# Patient Record
Sex: MALE | HISPANIC OR LATINO | Employment: FULL TIME | URBAN - METROPOLITAN AREA
[De-identification: names, ages, dates, MRNs, and addresses within clinical notes are randomized per-mention and may not be internally consistent; named-entity substitution may affect disease eponyms.]

---

## 2023-06-26 ENCOUNTER — OFFICE VISIT (OUTPATIENT)
Dept: FAMILY MEDICINE | Facility: CLINIC | Age: 33
End: 2023-06-26
Payer: COMMERCIAL

## 2023-06-26 VITALS
SYSTOLIC BLOOD PRESSURE: 133 MMHG | RESPIRATION RATE: 10 BRPM | OXYGEN SATURATION: 99 % | WEIGHT: 200.2 LBS | DIASTOLIC BLOOD PRESSURE: 81 MMHG | TEMPERATURE: 98.7 F | HEART RATE: 61 BPM

## 2023-06-26 DIAGNOSIS — R10.11 RUQ ABDOMINAL PAIN: ICD-10-CM

## 2023-06-26 DIAGNOSIS — K29.00 ACUTE GASTRITIS WITHOUT HEMORRHAGE, UNSPECIFIED GASTRITIS TYPE: Primary | ICD-10-CM

## 2023-06-26 DIAGNOSIS — Z87.19 HX OF GASTROESOPHAGEAL REFLUX (GERD): ICD-10-CM

## 2023-06-26 DIAGNOSIS — R10.9 FLANK PAIN: ICD-10-CM

## 2023-06-26 LAB
ALBUMIN UR-MCNC: NEGATIVE MG/DL
APPEARANCE UR: CLEAR
BILIRUB UR QL STRIP: NEGATIVE
COLOR UR AUTO: YELLOW
GLUCOSE UR STRIP-MCNC: NEGATIVE MG/DL
HGB UR QL STRIP: NEGATIVE
KETONES UR STRIP-MCNC: NEGATIVE MG/DL
LEUKOCYTE ESTERASE UR QL STRIP: NEGATIVE
NITRATE UR QL: NEGATIVE
PH UR STRIP: 5.5 [PH] (ref 5–8)
SP GR UR STRIP: >=1.03 (ref 1–1.03)
UROBILINOGEN UR STRIP-ACNC: 0.2 E.U./DL

## 2023-06-26 PROCEDURE — 99204 OFFICE O/P NEW MOD 45 MIN: CPT | Performed by: NURSE PRACTITIONER

## 2023-06-26 PROCEDURE — 81003 URINALYSIS AUTO W/O SCOPE: CPT | Performed by: NURSE PRACTITIONER

## 2023-06-26 RX ORDER — IBUPROFEN 200 MG
600 TABLET ORAL ONCE
Status: COMPLETED | OUTPATIENT
Start: 2023-06-26 | End: 2023-06-26

## 2023-06-26 RX ADMIN — Medication 600 MG: at 17:13

## 2023-06-26 ASSESSMENT — ENCOUNTER SYMPTOMS
CONSTIPATION: 0
CHILLS: 0
VOMITING: 0
DYSURIA: 0
FEVER: 0
NAUSEA: 0
HEMATURIA: 0
DIARRHEA: 0

## 2023-06-26 NOTE — PATIENT INSTRUCTIONS
Call [facility] to see if they can recheck you in 3-4 days.      Come back or to emergency room if worse     Can buy and use Mylanta for heartburn ded per package instructions.      [place] diet -- see handout.

## 2023-06-26 NOTE — PROGRESS NOTES
Assessment & Plan     Flank pain    - UA Macroscopic with reflex to Microscopic and Culture  - ibuprofen (ADVIL/MOTRIN) tablet 600 mg    RUQ abdominal pain    - lidocaine (viscous) (XYLOCAINE) 2 % 15 mL, alum & mag hydroxide-simethicone (MAALOX) 15 mL GI Cocktail  - omeprazole (PRILOSEC) 20 MG DR capsule  Dispense: 28 capsule; Refill: 0    Hx of gastroesophageal reflux (GERD)    - lidocaine (viscous) (XYLOCAINE) 2 % 15 mL, alum & mag hydroxide-simethicone (MAALOX) 15 mL GI Cocktail  - omeprazole (PRILOSEC) 20 MG DR capsule  Dispense: 28 capsule; Refill: 0    Acute gastritis without hemorrhage, unspecified gastritis type       Patient with persistent abdominal pain for about a week, worse over the last 3 days.  Points more so to the right upper quadrant area, but is not particularly tender on exam with a negative Ross sign.     Does rate the pain as severe.  Did consider kidney stone first with negative urine.      Does have a history of frequent episodes of heartburn despite use of daily omeprazole.  Is bloated with eating.    After negative urine, did try a GI cocktail.  Patient felt better immediately following this. Pain 4/10 after GI cocktail.      Recommend eliminating spicy foods, tomatoes, greasy food.  Increasing omeprazole from once a day to twice a day for 2 weeks.  If pain is worsening or he develops a fever, he needs to come back or go to emergency room for further evaluation rather than waiting for improvement.  Explained there are numerous other things that could be wrong, but it is a good sign that he has had frequent heartburn recently and the pain got rapidly better a few minutes after GI cocktail.      Recommend close follow-up with PCP, ideally recheck later this week even if doing better.    Also try Maalox or Mylanta as needed.      28 minutes spent by me on the date of the encounter doing chart review, patient visit, documentation and Rechecks         Return in about 3 days (around 6/29/2023)  "for If no better.    Paula Chou, CNP  [facility] JENNIFER Root is a 33 year old male who presents to clinic today for the following health issues:  Chief Complaint   Patient presents with     Abdominal Pain     X 1 week but flare up in pain was yesterday. RT side, feels bloated, stabbing pain.      HPI    Rt upper abd pain starting last week, but worse x 1 day.  Pain is 8/10.  Is not taken anything for pain today.    Feels like \"colic\" or shooting.      No h/o kidney stone.     Feels more bloated with eating.  Eats a lot of spicy food.    No fevers.      Hx GERD on omeprazole. Gets frequent heartburn.  Gets it numerous times per week despite taking omeprazole regularly.  However, he does not have any heartburn currently.  This was prescribed by primary care clinic.  Denies other hx surgeries or medical issues.            Review of Systems   Constitutional: Negative for chills and fever.   Gastrointestinal: Negative for constipation, diarrhea, nausea and vomiting.   Genitourinary: Negative for dysuria, hematuria and urgency.           Objective    /81 (BP Location: Right arm, Patient Position: Sitting, Cuff Size: Adult Regular)   Pulse 61   Temp 98.7  F (37.1  C) (Oral)   Resp 10   Wt 90.8 kg (200 lb 3.2 oz)   SpO2 99%   Physical Exam  Constitutional:       Appearance: He is well-developed.   HENT:      Right Ear: External ear normal.      Left Ear: External ear normal.   Eyes:      General:         Right eye: No discharge.         Left eye: No discharge.      Conjunctiva/sclera: Conjunctivae normal.   Pulmonary:      Effort: Pulmonary effort is normal.   Abdominal:      General: Bowel sounds are normal. There is no distension.      Palpations: Abdomen is soft.      Tenderness: There is abdominal tenderness (Mild tenderness right middle to upper abdomen.). There is no right CVA tenderness or guarding.   Musculoskeletal:         General: Normal range of motion. "   Skin:     General: Skin is warm.   Neurological:      Mental Status: He is alert and oriented to person, place, and time.   Psychiatric:         Mood and Affect: Mood normal.         Behavior: Behavior normal.         Thought Content: Thought content normal.         Judgment: Judgment normal.        Results for orders placed or performed in visit on 06/26/23   UA Macroscopic with reflex to Microscopic and Culture     Status: Normal    Specimen: Urine, Clean Catch   Result Value Ref Range    Color Urine Yellow Colorless, Straw, Light Yellow, Yellow    Appearance Urine Clear Clear    Glucose Urine Negative Negative mg/dL    Bilirubin Urine Negative Negative    Ketones Urine Negative Negative mg/dL    Specific Gravity Urine >=1.030 1.005 - 1.030    Blood Urine Negative Negative    pH Urine 5.5 5.0 - 8.0    Protein Albumin Urine Negative Negative mg/dL    Urobilinogen Urine 0.2 0.2, 1.0 E.U./dL    Nitrite Urine Negative Negative    Leukocyte Esterase Urine Negative Negative    Narrative    Microscopic not indicated

## 2023-09-11 ENCOUNTER — HOSPITAL ENCOUNTER (EMERGENCY)
Facility: CLINIC | Age: 33
Discharge: HOME | End: 2023-09-11
Payer: OTHER MISCELLANEOUS

## 2023-09-11 ENCOUNTER — APPOINTMENT (OUTPATIENT)
Dept: RADIOLOGY | Facility: CLINIC | Age: 33
End: 2023-09-11
Payer: OTHER MISCELLANEOUS

## 2023-09-11 VITALS
OXYGEN SATURATION: 99 % | HEART RATE: 74 BPM | WEIGHT: 195 LBS | HEIGHT: 68 IN | DIASTOLIC BLOOD PRESSURE: 80 MMHG | BODY MASS INDEX: 29.55 KG/M2 | TEMPERATURE: 98.4 F | RESPIRATION RATE: 20 BRPM | SYSTOLIC BLOOD PRESSURE: 140 MMHG

## 2023-09-11 DIAGNOSIS — S62.656B OPEN NONDISPLACED FRACTURE OF MIDDLE PHALANX OF RIGHT LITTLE FINGER, INITIAL ENCOUNTER: ICD-10-CM

## 2023-09-11 DIAGNOSIS — S61.216A LACERATION OF RIGHT LITTLE FINGER WITHOUT FOREIGN BODY WITHOUT DAMAGE TO NAIL, INITIAL ENCOUNTER: ICD-10-CM

## 2023-09-11 PROCEDURE — 29130 APPL FINGER SPLINT STATIC: CPT | Mod: F9

## 2023-09-11 PROCEDURE — 12041 INTMD RPR N-HF/GENIT 2.5CM/<: CPT

## 2023-09-11 PROCEDURE — 250N000009 HC RX 250

## 2023-09-11 PROCEDURE — 73140 X-RAY EXAM OF FINGER(S): CPT | Mod: RT

## 2023-09-11 PROCEDURE — 99284 EMERGENCY DEPT VISIT MOD MDM: CPT | Mod: 25

## 2023-09-11 RX ORDER — GINSENG 100 MG
CAPSULE ORAL ONCE
Status: COMPLETED | OUTPATIENT
Start: 2023-09-11 | End: 2023-09-11

## 2023-09-11 RX ORDER — CIPROFLOXACIN 500 MG/1
500 TABLET, FILM COATED ORAL 2 TIMES DAILY
Qty: 10 TABLET | Refills: 0 | Status: SHIPPED | OUTPATIENT
Start: 2023-09-11 | End: 2023-09-16

## 2023-09-11 RX ORDER — CEPHALEXIN 500 MG/1
500 CAPSULE ORAL 4 TIMES DAILY
Qty: 20 CAPSULE | Refills: 0 | Status: SHIPPED | OUTPATIENT
Start: 2023-09-11 | End: 2023-09-11

## 2023-09-11 RX ADMIN — BACITRACIN: 500 OINTMENT TOPICAL at 22:28

## 2023-09-11 ASSESSMENT — ACTIVITIES OF DAILY LIVING (ADL): ADLS_ACUITY_SCORE: 33

## 2023-09-11 ASSESSMENT — ENCOUNTER SYMPTOMS
NUMBNESS: 1
WOUND: 1

## 2023-09-12 NOTE — ED TRIAGE NOTES
Patient arrives for right pinky finger injury. Patient was at work and finger got crushed in a machine. Bleeding controlled in triage.      Triage Assessment       Row Name 09/11/23 1913       Triage Assessment (Adult)    Airway WDL WDL       Respiratory WDL    Respiratory WDL WDL       Skin Circulation/Temperature WDL    Skin Circulation/Temperature WDL WDL       Cardiac WDL    Cardiac WDL WDL       Peripheral/Neurovascular WDL    Peripheral Neurovascular WDL WDL       Cognitive/Neuro/Behavioral WDL    Cognitive/Neuro/Behavioral WDL WDL

## 2023-09-12 NOTE — ED PROVIDER NOTES
EMERGENCY DEPARTMENT ENCOUNTER      NAME: Dk Metz  AGE: 33 year old male  YOB: 1990  MRN: [id number]  EVALUATION DATE & TIME: No admission date for patient encounter.    PCP: No Ref-Primary, Physician    ED PROVIDER: Elaine Feliz PA-C      Chief Complaint   Patient presents with    Finger Injury         FINAL IMPRESSION:  1. Laceration of right little finger without foreign body without damage to nail, initial encounter    2. Open nondisplaced fracture of middle phalanx of right little finger, initial encounter          ED COURSE & MEDICAL DECISION MAKIN:15 PM Met with patient for initial interview. Plan for care discussed.  9:05 PM Reevaluated and updated patient on imaging results.   9:38 PM I performed a laceration procedure on the patient.  10:05 PM I discussed the plan for discharge with the patient, and patient is agreeable. We discussed supportive cares at home and reasons for return to the ER including new or worsening symptoms. All questions and concerns addressed. Patient to be discharged by RN.    Pertinent Labs & Imaging studies reviewed. (See chart for details)  33 year old male presents to the Emergency Department for evaluation of right pinky laceration crush injury while at work working on machine. Patient sent in from  for XR and closure. Upon exam, patient is afebrile, mildly hypertensive, but in no acute distress. Right pinky laceration as pictured below. Neurovascularly intact distally. ROM intact, but limited secondary to pain. No nail or obvious tendon involvement. 5/5 strength against resistance flexion and extension at PIP and DIP. Differential diagnosis includes but not limited to fracture, dislocation, contusion. Based on patient's presenting symptoms, imaging were ordered. XR revealed small opacity over base of middle phalanx, which appears consistent with age indeterminate fracture, but in setting of recent trauma will treat as acute  fracture.    Patient declined pain medication upon arrival. Tetanus up-to-date. Based on the presenting symptoms, the wound was irrigated, explored, and closed with 9 sutures as documented below. Finger was placed in splint for suspected fracture. Discussed in setting of acute fracture and laceration through glove, will start on Cipro and have patient follow up with orthopedics. Patient was educated on signs of infection including redness, drainage, warmth, fever/chills with instructions to return immediately if infection suspected or new weakness/numbness/tingling, decreased ROM, or cold extremity. Patient also educated to keep the wound clean and dry for the next 24 hours. Patient advised to set up an appointment with PCP in 10-12 days for suture removal and follow up with orthopedics later this week for reevaluation, referral placed today. The patient was advised to return to the ER if new or worsening symptoms develop. The patient was stable and well appearing throughout entire ER visit and upon discharge. The patient verbalizes understanding and agrees with the plan.    Medical Decision Making    History:  Supplemental history from: Documented in chart, if applicable and Family Member/Significant Other  External Record(s) reviewed: Documented in chart, if applicable. and Outpatient Record: Patient visited [facility] on 9/11, with a concern of a laceration on 5th finger extending to PIP joint. Sent to ER for x-ray.    Work Up:  Chart documentation includes differential considered and any EKGs or imaging independently interpreted by provider, where specified.  In additional to work up documented, I considered the following work up: Documented in chart, if applicable.    External consultation:  Discussion of management with another provider: Documented in chart, if applicable    Complicating factors:  Care impacted by chronic illness: N/A  Care affected by social determinants of health:  N/A    Disposition considerations: Discharge. I prescribed additional prescription strength medication(s) as charted. See documentation for any additional details.        MEDICATIONS GIVEN IN THE EMERGENCY:  Medications   bacitracin ointment ( Topical $Given 9/11/23 9060)       NEW PRESCRIPTIONS STARTED AT TODAY'S ER VISIT  Discharge Medication List as of 9/11/2023 10:29 PM        START taking these medications    Details   ciprofloxacin (CIPRO) 500 MG tablet Take 1 tablet (500 mg) by mouth 2 times daily for 5 days, Disp-10 tablet, R-0, Local Print                =================================================================    HPI    Patient information was obtained from: Patient's Family    Use of : N/A       Dk Metz is a 33 year old male with no documented history who presents to this ED by private car for evaluation of a finger injury.    Patient reports an onset of a 5th right finger laceration which occurred today at work, while using a machine with leather gloves. He notes his pain worsens when bending or moving. He endorses mild numbness and tingling. Patient last ate at 11:00 AM this morning. He denies any history of diabetes. His last TDAP was in 2021. No other medical concerns.     REVIEW OF SYSTEMS   Review of Systems   Skin:  Positive for wound (5th right finger laceration).   Neurological:  Positive for numbness (with tingling on finger).   All other systems reviewed and are negative.       PAST MEDICAL HISTORY:  History reviewed. No pertinent past medical history.    PAST SURGICAL HISTORY:  History reviewed. No pertinent surgical history.        CURRENT MEDICATIONS:    ciprofloxacin (CIPRO) 500 MG tablet        ALLERGIES:  No Known Allergies    FAMILY HISTORY:  History reviewed. No pertinent family history.    SOCIAL HISTORY:   Social History     Socioeconomic History    Marital status: Single       VITALS:  BP (!) 140/80   Pulse 74   Temp 98.4  F (36.9  C) (Oral)    "Resp 20   Ht 1.727 m (5' 8\")   Wt 88.5 kg (195 lb)   SpO2 99%   BMI 29.65 kg/m      PHYSICAL EXAM    Constitutional:  Alert, in no acute distress. Cooperative.  EYES: Conjunctivae clear.  HENT:  Atraumatic, normocephalic.  Respiratory:  Respirations even, unlabored, in no acute respiratory distress.  Cardiovascular:  Regular rate, good peripheral perfusion.  GI: Soft, flat, non-distended.  Musculoskeletal: Right upper extremity: tenderness to palpation over dorsal PIP joint. 2.5 cm laceration over dorsal PIP joint. Bleeding well controlled with direct pressure. No surrounding erythema, warmth, purulence, fluctuance, swelling, crepitus, or obvious bony deformity. No obvious foreign body visualized. No nail or obvious tendon involvement. Flexion PIP limited secondary to pain, full extension. Full ROM of DIP and MCP joints. 5/5 strength against resistance flexion and extension at PIP and DIP. Neurovascularly intact distally. Cap refill <2 seconds. Surrounding compartments soft.  Integument: Warm, Dry.   Neurologic:  Alert & oriented. No focal deficits noted.  Psych: Normal mood and affect.          LAB:  All pertinent labs reviewed and interpreted.  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 Small oval radiopacity over the dorsal base of the fifth middle phalanx which may be degenerative or sequela of age indeterminant trauma. Otherwise, normal joint spaces and alignment.         RADIOLOGY:  Reviewed all pertinent imaging. Please see official radiology report.  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 Small oval radiopacity over the dorsal base of the fifth middle phalanx which may be degenerative or sequela of age indeterminant trauma. Otherwise, normal joint spaces and alignment.       "       PROCEDURES:   PROCEDURE: Laceration Repair   INDICATIONS: Laceration   PROCEDURE PROVIDER: Elaine Feliz PA-C   SITE: Right pinky finger   TYPE/SIZE: simple, clean, and no foreign body visualized  2.5 cm c-chaped (total length)   FUNCTIONAL ASSESSMENT: Distal sensation, circulation, motor, and tendon function intact   MEDICATION: 2 mLs of 1% Lidocaine without epinephrine digital block   PREPARATION: irrigation with Normal saline   DEBRIDEMENT: wound explored, no foreign body found   CLOSURE:  Intermediate layer closed with 9 stitches of 5-0 Ethilon simple interrupted    Total number of sutures/staples placed: 9       PROCEDURE: Digital Block   INDICATIONS: laceration   PROCEDURE PROVIDER: Elaine Feliz PA-C    SITE: Right pinky finger   MEDICATION: 1% Lidocaine without Epinephrine   NOTE: The skin overlying the site for injection was prepped with betadine.  Needle was inserted in a standard 2 point injection pattern.  Each time the area was aspirated and there was no return of blood.  I then injected the medication at the base of the digit.  A total of 2 ml of the medication was injected.  The patient had good response to the procedure   COMPLICATIONS: None     I, Becki Wheeler, am serving as a scribe to document services personally performed by Elaine Feliz PA-C based on my observation and the provider's statements to me. I, Elaine Feliz PA-C, attest that Becki Wheeler is acting in a scribe capacity, has observed my performance of the services and has documented them in accordance with my direction.    Elaine Feliz PA-C  [facility] EMERGENCY ROOM  [address] [id number] [phone]      Elaine Feliz PA-C  09/11/23 4445